# Patient Record
Sex: MALE | Race: WHITE | NOT HISPANIC OR LATINO | Employment: FULL TIME | URBAN - NONMETROPOLITAN AREA
[De-identification: names, ages, dates, MRNs, and addresses within clinical notes are randomized per-mention and may not be internally consistent; named-entity substitution may affect disease eponyms.]

---

## 2020-02-16 ENCOUNTER — HOSPITAL ENCOUNTER (EMERGENCY)
Facility: HOSPITAL | Age: 60
Discharge: HOME | End: 2020-02-16
Attending: EMERGENCY MEDICINE | Admitting: EMERGENCY MEDICINE

## 2020-02-16 ENCOUNTER — APPOINTMENT (OUTPATIENT)
Dept: ULTRASOUND IMAGING | Facility: HOSPITAL | Age: 60
End: 2020-02-16

## 2020-02-16 VITALS
RESPIRATION RATE: 17 BRPM | SYSTOLIC BLOOD PRESSURE: 138 MMHG | HEART RATE: 77 BPM | HEIGHT: 70 IN | BODY MASS INDEX: 24.54 KG/M2 | OXYGEN SATURATION: 99 % | TEMPERATURE: 98.1 F | DIASTOLIC BLOOD PRESSURE: 68 MMHG | WEIGHT: 171.4 LBS

## 2020-02-16 DIAGNOSIS — M79.662 PAIN OF LEFT CALF: Primary | ICD-10-CM

## 2020-02-16 DIAGNOSIS — I82.819 ACUTE SUPERFICIAL VENOUS THROMBOSIS OF LOWER EXTREMITY, UNSPECIFIED LATERALITY: ICD-10-CM

## 2020-02-16 PROCEDURE — 93971 EXTREMITY STUDY: CPT

## 2020-02-16 PROCEDURE — 99282 EMERGENCY DEPT VISIT SF MDM: CPT

## 2020-02-16 RX ORDER — TADALAFIL 5 MG/1
5 TABLET ORAL DAILY PRN
COMMUNITY

## 2020-02-16 NOTE — ED PROVIDER NOTES
Subjective   59-year-old male patient presents emergency room for evaluation of left calf pain x10 days and bruising x1 day.  Patient denies any trauma or injury.  He states that around 10 days ago began to experience some mild pain in his left calf with ambulation.  He denies any shortness of breath, chest pain or palpitations.  Yesterday, he awoke and noticed some bruising on the medial aspect of his left calf.  He denies any trauma or injury.  No anticoagulation therapy.  No recent long car trips or airline travel.  History of DVT in the past.  It is characterized as an aching sensation in his right calf, DP and PT pulses are palpable 2+.  Homans sign- negative.          Review of Systems  A 10 point review of systems including constitutional, ENT, cardiovascular, respiratory, GI, , musculoskeletal, neuro, skin, psychiatric was performed and it was negative with exception to those detailed in HPI.        History reviewed. No pertinent past medical history.    No Known Allergies    History reviewed. No pertinent surgical history.    History reviewed. No pertinent family history.    Social History     Socioeconomic History   • Marital status:      Spouse name: Not on file   • Number of children: Not on file   • Years of education: Not on file   • Highest education level: Not on file   Tobacco Use   • Smoking status: Never Smoker   Substance and Sexual Activity   • Alcohol use: Yes     Alcohol/week: 3.0 standard drinks     Types: 3 Cans of beer per week     Comment: per week   • Drug use: Never           Objective   Physical Exam  Constitutional: The patient is cooperative. Appears well-developed and well-nourished.  Psychological: Alert and oriented x3. No abnormalities of mood affect.  Eyes: Pupils are equal round reactive to light.  Conjunctiva are within normal limits.  ENT: Oropharynx is clear.  Neck: The neck is supple.  No crepitus.  Full range of motion without pain.  Chest: There is no tenderness  to the chest wall.  Respiratory: Respiratory effort was normal.  There is no rales, wheezes, or rhonchi.  There is no stridor.  Air entry is equal.  Cardiovascular: Regular rate and rhythm, no murmurs, gallops, rubs.  Capillary refill is brisk.  Gastrointestinal: Abdomen soft, nontender, nondistended.  There is no rebound tenderness or guarding.  No organomegaly is noted.  Genitourinary: Not examined  Lymphatic: No gross lymphadenopathy noted.  Musculoskeletal: Musculoskeletal system is grossly intact.  There is no obvious deformity.  Neurological: Zeny Coma Scale is 15.  Gross motor movement is intact in all 4 extremities.  Patient exhibits normal speech.  Skin: Skin is normal to inspection and palpation exception of a moderate area of ecchymosis to the medial aspect of the left calf.  Homans sign is negative.  DP and PT pulses 2+ bilaterally.  Refill is brisk at less than 2 seconds.  Otherwise skin is warm and dry.  No other obvious bruising.  No obvious rash.    Procedures           ED Course      Care and management of this patient turned over to Dr Iron Calderón at 1417.    *Please note that this dictation was completed with computer voice recognition software.  Quite often unanticipated grammatical, syntax, homophones, and other interpretive errors are inadvertently transcribed by the computer software.  I have reviewed the transcript, but some of these errors may have  escaped final proofreading.                                     MDM    Final diagnoses:   Pain of left calf   Acute superficial venous thrombosis of lower extremity, unspecified laterality            Iron Calderón, DO  02/21/20 045

## 2024-07-15 ENCOUNTER — TELEPHONE (OUTPATIENT)
Dept: SURGERY | Facility: CLINIC | Age: 64
End: 2024-07-15
Payer: COMMERCIAL

## 2024-07-15 NOTE — TELEPHONE ENCOUNTER
Pt had called himself to get scheduled for a possible colonoscopy-I have left him a VM-Will try again to reach the pt to go over the colonoscopy screening questions. 1st attempt.

## 2024-07-22 NOTE — TELEPHONE ENCOUNTER
CALL RECEIVED  DURING DOWNTIME ON 7/19/24 @ 12:56pm.  PATIENT WAS RETURNING CALL TO [place] FOR COLONOSCOPY OA

## 2024-07-22 NOTE — TELEPHONE ENCOUNTER
PRESCREENING FOR OPEN ACCESS SCHEDULING    Juan J Ferrer, 1960  [id number]    07/22/24    If, the patient answers yes to any of the following questions the provider will be informed prior to scheduling open access for approval and documented in the chart.    [x]  Yes  [] No    1. Have you ever had a colonoscopy in the past?      When:  around 6 yrs       Where: [place]        Polyps or other:     []  Yes  [x] No    2. Family history of colon cancer?      Relation:       Age of onset:       Do you currently have any of the following?    []  Yes  [x] No  Rectal bleeding, if so, how long?     []  Yes  [x] No  Abdominal pain, if so, how long?    []  Yes  [x] No  Constipation, if so, how long?    []  Yes  [x] No  Diarrhea, if so, how long?    []  Yes  [x] No  Weight loss, is so, how much?    [] Yes  [x] No  Small caliber stool, if so, how long?    []Yes  [x] No  Do you have Hemorroids?    []Yes  [x] No  Have you been diagnosed with Anemia?    []Yes  [x] No  Do you have difficulty swallowing?    []Yes  [x] No  Do you have acid reflux?    Have you ever had any of the following conditions?    [] Yes  [x] No  Heart attack?      When?       Last cardiac workup?     Blood thinners?    [] Yes  [x] No   Lung problems, asthma or COPD?  [] Yes  [x] No  Oxygen required?       [] Yes  [x] No  Stroke?     [] Yes  [x] No  Have you ever had a reaction to anesthesia?

## 2024-07-22 NOTE — TELEPHONE ENCOUNTER
Pt would like to be scheduled at [facility] on 09/10 w/ Dr. Greer-verified pharmacy/insurance. Thank you.

## 2024-07-23 RX ORDER — BISACODYL 5 MG/1
TABLET, DELAYED RELEASE ORAL
Qty: 4 TABLET | Refills: 0 | Status: SHIPPED | OUTPATIENT
Start: 2024-07-23

## 2024-07-23 RX ORDER — POLYETHYLENE GLYCOL 3350 17 G/17G
POWDER, FOR SOLUTION ORAL
Qty: 238 G | Refills: 0 | Status: SHIPPED | OUTPATIENT
Start: 2024-07-23

## 2024-07-24 ENCOUNTER — OFFICE VISIT (OUTPATIENT)
Dept: UROLOGY | Facility: CLINIC | Age: 64
End: 2024-07-24
Payer: COMMERCIAL

## 2024-07-24 VITALS
OXYGEN SATURATION: 96 % | DIASTOLIC BLOOD PRESSURE: 82 MMHG | HEIGHT: 70 IN | HEART RATE: 61 BPM | WEIGHT: 168 LBS | BODY MASS INDEX: 24.05 KG/M2 | TEMPERATURE: 98.6 F | SYSTOLIC BLOOD PRESSURE: 122 MMHG

## 2024-07-24 DIAGNOSIS — Z80.42 FAMILY HISTORY OF PROSTATE CANCER IN FATHER: ICD-10-CM

## 2024-07-24 DIAGNOSIS — N52.9 ERECTILE DYSFUNCTION, UNSPECIFIED ERECTILE DYSFUNCTION TYPE: Primary | ICD-10-CM

## 2024-07-24 PROBLEM — J30.9 ALLERGIC RHINITIS: Status: ACTIVE | Noted: 2019-07-19

## 2024-07-24 RX ORDER — TADALAFIL 10 MG/1
10 TABLET ORAL DAILY PRN
Qty: 30 TABLET | Refills: 5 | Status: SHIPPED | OUTPATIENT
Start: 2024-07-24

## 2024-07-24 RX ORDER — TADALAFIL 10 MG/1
10 TABLET ORAL DAILY PRN
COMMUNITY
End: 2024-07-24 | Stop reason: SDUPTHER

## 2024-07-24 NOTE — PROGRESS NOTES
Office Visit Erectile Dysfunction New     Patient Name: Juan J Ferrer  : 1960   MRN: [id number]     Chief Complaint: Erectile Dysfunction.   Chief Complaint   Patient presents with    [facility] Care     ED  Med refills  Prostate exam overdue       Referring Provider: Referring, Self    History of Present Illness: Juan J Ferrer is a 64 y.o. male who presents today with history of kidney stones.  Patient presents with c/o erectile dysfunction.    He primarily has difficulty with achieving and maintaining an erection.    He has tried tadalafil in the past with satisfactory results.    With tadalafil, he rates his erectile rigidity as a 6 (80% of the time) on a scale of 0-10 (with 6 being just firm enough for penetration).     Without medication, he rates his erectile rigidity as a 6 (20% of the time) on a scale of 0-10 (with 6 being just firm enough for penetration).     He does not take the oral PDE5- on an empty stomach, 1 hour prior to intercourse and without alcohol. He does not take oral nitrates for chest pain.    Family history of prostate cancer, father around age 80 had prostatectomy.  Last PSA was last fall and reports WNL.      Subjective      Review of System:   As noted in HPI.     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 No pertinent surgical history.    Family History:   Family History   Problem Relation Age of Onset    Prostate cancer Father     Stroke Father     Cancer Maternal Grandmother     Cancer Maternal Grandfather     Diabetes Maternal Grandfather      He has no family history of bladder or kidney cancer.  There is not a strong family history of cardiovascular disease.    Social History:   Social History     Socioeconomic History    Marital status:    Tobacco Use    Smoking status:  "Never     Passive exposure: Never    Smokeless tobacco: Never   Vaping Use    Vaping status: Never Used   Substance and Sexual Activity    Alcohol use: Yes     Alcohol/week: 3.0 standard drinks of alcohol     Types: 3 Cans of beer per week     Comment: per week    Drug use: Never    Sexual activity: Defer       Medications:     Current Outpatient Medications:     tadalafil (CIALIS) 10 MG tablet, Take 1 tablet by mouth Daily As Needed for Erectile Dysfunction. Take 1-2 tablets 1 hour prior to intercourse., Disp: 30 tablet, Rfl: 5    bisacodyl 5 MG EC tablet, 4 tablets to be taken at time directed on instructions the day prior to colonoscopy, Disp: 4 tablet, Rfl: 0    polyethylene glycol (MIRALAX) 17 GM/SCOOP powder, Mix 238g powder with 64 oz of clear liquid. Starting at 5pm drink 80z every 10-15 minutes until consumed., Disp: 238 g, Rfl: 0    Allergies:   No Known Allergies    Objective     Physical Exam:   Vital Signs:   Vitals:    07/24/24 1506   BP: 122/82   Pulse: 61   Temp: 98.6 °F (37 °C)   SpO2: 96%   Weight: 76.2 kg (168 lb)   Height: 177.8 cm (70\")     Body mass index is 24.11 kg/m².   Physical Exam  Vitals and nursing note reviewed.   Constitutional:       General: He is awake. He is not in acute distress.     Appearance: He is not ill-appearing.   Pulmonary:      Effort: Pulmonary effort is normal.   Skin:     General: Skin is warm and dry.   Neurological:      Mental Status: He is alert and oriented to person, place, and time. Mental status is at baseline.   Psychiatric:         Mood and Affect: Mood normal.         Behavior: Behavior is cooperative.              Labs  No results found for: \"TESTOSTERONE\", \"PROLACTIN\", \"FSH\", \"LH\", \"HCT\"    No results found for: \"PSA\"    Assessment / Plan      Assessment  Mr. Ferrer is a 64 y.o. male with erectile dysfunction.  Risk factors include age.  Patient former employee of [email or web address] and now wishes to establish urology care with us.  He does have a family history " of prostate cancer, father around age 80.  He reports last year he had PSA drawn and was WNL.  Will obtain PSA today for baseline and defer CORY.      Patient reports rigidity of 6/10 60% of the time on tadalafil 10 mg 1 hour prior to intercourse.  Discussed he can increase to 20 mg max per day 1 hour prior to intercourse.  Best taken on empty stomach.  Avoid alcohol.        Plan  1.   I discussed treatment options including oral PDE5- medication, intra-urethral suppositories, pharmacologic injections, vacuum erection devices and implantable penile prostheses.      2. The patient was educated about use of tadalafil.  He was counseled on appropriate use, timing and the need for sexual stimulation.  In addition, he understands not to use this medication with nitrates. He was instructed to take the medication about 1 hour prior to sexual activity.  Side effects were explained to the patient such as headache, visual changes, upset stomach, and back pain. Lastly, he was instructed to go immediately to his nearest emergency room in the event he developed an erection lasting longer than 3 hours. He voiced understanding of all these instructions and the importance of seeking prompt medical attention for an erection lasting longer than 3 hours.     3. We also discussed association of erectile dysfunction and future coronary events. Erectile dysfunction can be a marker for future coronary events and usually precedes by 2-4 years.   Patient has had ED for 15 years.  Recommended he discuss EKG and possible stress test with his PCP and reports he does annual physicals.  PCP following his cholesterol and reports no issues with cholesterol.     4.  PSA today    5.  PSA in 1 year.    6.  Follow up in 1 year with PSA prior and UA.      Follow Up:   Return in about 1 year (around 7/24/2025) for recheck with Chastity, labs prior.      DARLYN Mcoky  [facility]

## 2024-07-29 ENCOUNTER — TELEPHONE (OUTPATIENT)
Dept: SURGERY | Facility: CLINIC | Age: 64
End: 2024-07-29
Payer: COMMERCIAL

## 2024-07-29 NOTE — TELEPHONE ENCOUNTER
PT CALLED BACK AND I TOLD HIM WE WERE NEEDING TO KNOW WHERE HE HAD HIS LAST COLONOSCOPY DONE IN [place] BUT HE COULD NOT RECALL THE FACILITY NAME BC ITS BEEN 7 YEARS SINCE HE HAD IT DONE BUT IS GOING TO TRY TO FIGURE IS OUT AND WILL CALL US BACK

## 2024-07-29 NOTE — TELEPHONE ENCOUNTER
----- Message from Rajni GAITAN sent at 7/26/2024  4:43 PM EDT -----  Regarding: FW: case request  Can you call the patient and see if he has contact information for where he had the colonoscopy in [place]?  We will need to call them and get them to fax us a copy.  Then give it to Dr Greer for review to see if the patient needs a colonoscopy this soon.  ----- Message -----  From: Roz Olson MA  Sent: 7/25/2024   2:32 PM EDT  To: Michelle L Milligan, MA  Subject: FW: case request                                 Schuyler larson [place].....  ----- Message -----  From: Nawaf Greer MD  Sent: 7/25/2024   2:26 PM EDT  To: Roz Olson MA  Subject: RE: case request                                 Polyp history?  Why are we repeating it for 10 years?  ----- Message -----  From: Roz Olson MA  Sent: 7/23/2024   1:11 PM EDT  To: Nawaf Greer MD; #  Subject: case request                                     Colon @ [facility] 09/10, no fam hx, last colon around 6 years ago in [place]          Left vm for pt to call our office, to see where he had his last colonoscopy done so we could get a copy.

## 2025-06-17 ENCOUNTER — TELEPHONE (OUTPATIENT)
Dept: UROLOGY | Facility: CLINIC | Age: 65
End: 2025-06-17

## 2025-06-17 NOTE — TELEPHONE ENCOUNTER
Provider will be out of the office on 7/25/2025.  [facility]  Hub can read and reschedule.